# Patient Record
Sex: FEMALE
[De-identification: names, ages, dates, MRNs, and addresses within clinical notes are randomized per-mention and may not be internally consistent; named-entity substitution may affect disease eponyms.]

---
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
## 2025-04-02 NOTE — ASSESSMENT
[FreeTextEntry1] : Initial Consultation Form\par Historian(s): mother/self				Language: English\par Referring MD: Rodriguez				Date/Time of initial consultation ___19 12:57 PM_\par Pediatrician: Rodriguez\par Reason for referral: 19 year old female referred for evaluation of a vascular lesion in left axilla. First noted 1 year ago and now larger. s/p ultrasound ([facility]) – non-specific. Labs were normal. . \par No pain, bleeding, or ulceration.   No other vascular lesions.  No discoloration.\par Other past medical history: asthma\par Birth History:\par Hospital: [facility]					 – loss of fetal heartbeat\par Gestational age: FT				Fertility Rx:      \par Birth weight:	      					\par Amnio/CVS:	     					Pregnancy course:      \par  problems:	hypoglycemia – in NICU x 1 day		Smoking during pregnancy: no Alcohol: no\par Drugs/medications: prenatal vitamins      \par Maternal age at childbirth:      	Maternal occupation:      \par Paternal age at childbirth:      	Paternal occupation:      \par Ethnicity:  Bar[place], [place], Polish, Bangladeshi heritage\par Siblings/gender/age/health status: 2 siblings – A&W\par Current medications:   Albuterol, Montelikast, Epi pen			Allergies: peanuts, tree nuts, chick peas, peas, lentils, sesame seeds, environmental\par Prior surgery/hospitalization: none/none\par Prior radiologic test: x-ray, u/s, CT, MRI – see above\par Immunizations: Up-to-date – history\par Family history: Hemangiomas: none   Vascular malformations: none Family History of bleeding and/or premature thromboses?  none   Other: none\par Social/Family History:      \par  arrangement: home with parents	Schooling: finished 11th grade – [employer] – participates in ministry after high school\par Development (Ht/Wt): normal  Motor: appropriate for age 	Sensory: astigmatism – should wear eyeglasses\par Review of Systems\par General: feels “fine”\par Frequent ear infections - none ___________________________________________\par Frequent headaches: menstrual related___________________________________________________\par Asthma/bronchitis/bronchiolitis/pneumonia/stridor – asthma – controlled with medication, exercise induced; seasonal\par Heart problem or heart murmur - none _________________________________________\par Anemia or bleeding problem: none ____________________________________________\par Easy bruising: none		Bleed with toothbrushing? none\par Blood transfusion - none ____________________________________________________\par Thrombosis problem - none __________________________________________________\par Chronic or recurrent skin problems: eczema – treated with topicals\par Frequent abdominal pain/colic - none\par Elimination:  normal 	Constipation – no\par Bladder or kidney infection - none ____________________________________________\par Diabetes/thyroid/endocrine problems: none ______________________________________\par Age of menarche __18 yo__   Problems with menstrual cycle? headaches _________________________\par Nutrition: Specialized: none ________________________________________\par Sleep pattern: _ none _			Pain: ___ none ______\par Physical examination    Wt. =         Pain: none\par 						Normal	Abnormal findings and comments\par General appearance			alert, active, in no acute distress\par Mood and affect				normal\par Head						normal\par Eyes						normal\par Ears						normal\par Nose						normal\par Pharynx/buccal mucosa/throat		 no intraoral vascular lesions\par Neck						normal\par Chest					clear R&L, no stridor, rhonchi or wheezing\par Heart						normal\par Abdomen					normal\par Extremities		2.3x2.5 cm non-tender subcutaneous mass of left axilla, soft, non-tender, no cutaneous discoloration\par Back					ND\par Skin					see above and photographs\par Neurologic					normal\par Pulses 						normal\par Impression/Plan: Subcutaneous mass of left axilla, asymptomatic, increasing in size, with non-specific ultrasound findings. I do not have the report. We requested this, however, pediatrician’s office will not release this until mother signs some paperwork. I spoke with one the radiologists who reviewed the ultrasound, and she suggested an MRI, which I was planning to do as well. Tentative diagnosis is vascular malformation, although lesion is a bit atypical.  Discussed diagnosis and most likely clinical course with parents. As per radiologist: “Nonspecific thickening of left axilla without discrete mass or cyst – DDx includes vascular malformation. Suggests MRI.” Awaiting the report. meanwhile, will order MRI. Patient and mother are amenable to plan. I will see patient after the MRI. Family will be out of town for 2 weeks in July. All questions answered.  Routine care with pediatrician.\par Prior labs reviewed: N/A	Prior radiologic studies reviewed: N/A\par Prior consultations/chart reviewed: intake questionnaire\par Follow-up visit: after MRI\par Photograph consent: yes			Photograph taken: yes	Referrals: none\par Letter to referring md: pcp     \par Signature/Date/Time: __Gemma Rmoano MD____________19 1:35 PM_____________\par History/ROS/exam; coordination of care/counseling >50%. Photograph, downloading, cropping, arranging, 10 minutes.
done